# Patient Record
(demographics unavailable — no encounter records)

---

## 2025-04-17 NOTE — HISTORY OF PRESENT ILLNESS
[FreeTextEntry1] : 32yo for carrie. exam no complaints using condoms for contraception breast feeding, not having menses last pap 2023 neg, HPV neg

## 2025-04-17 NOTE — COUNSELING
[Nutrition/ Exercise/ Weight Management] : nutrition, exercise, weight management
Zee Calzada M.D: 34yoM hx ESRD on HD (s/p failed transplant) recent admission where permacath was palced, p/w bleeding from permacath site. was leaking since placed by IR on tuesday, but now is soaking through multiple pieces of gauze, towels, and shirts. endorses lightheadedness and weakness, but no sob no cp. had HD today without issue.